# Patient Record
Sex: MALE | Race: WHITE | NOT HISPANIC OR LATINO | Employment: OTHER | URBAN - METROPOLITAN AREA
[De-identification: names, ages, dates, MRNs, and addresses within clinical notes are randomized per-mention and may not be internally consistent; named-entity substitution may affect disease eponyms.]

---

## 2019-06-05 PROBLEM — M54.30 SCIATICA: Status: ACTIVE | Noted: 2019-06-05

## 2021-01-13 ENCOUNTER — IMMUNIZATION (OUTPATIENT)
Dept: FAMILY MEDICINE | Facility: CLINIC | Age: 76
End: 2021-01-13
Payer: MEDICARE

## 2021-01-13 DIAGNOSIS — Z23 NEED FOR VACCINATION: ICD-10-CM

## 2021-01-13 PROCEDURE — 91300 COVID-19, MRNA, LNP-S, PF, 30 MCG/0.3 ML DOSE VACCINE: CPT | Mod: PBBFAC | Performed by: FAMILY MEDICINE

## 2021-02-03 ENCOUNTER — IMMUNIZATION (OUTPATIENT)
Dept: FAMILY MEDICINE | Facility: CLINIC | Age: 76
End: 2021-02-03
Payer: MEDICARE

## 2021-02-03 DIAGNOSIS — Z23 NEED FOR VACCINATION: Primary | ICD-10-CM

## 2021-02-03 PROCEDURE — 0002A COVID-19, MRNA, LNP-S, PF, 30 MCG/0.3 ML DOSE VACCINE: CPT | Mod: PBBFAC | Performed by: FAMILY MEDICINE

## 2021-02-03 PROCEDURE — 91300 COVID-19, MRNA, LNP-S, PF, 30 MCG/0.3 ML DOSE VACCINE: CPT | Mod: PBBFAC | Performed by: FAMILY MEDICINE

## 2021-08-12 PROBLEM — U07.1 COVID-19 VIRUS INFECTION: Status: ACTIVE | Noted: 2021-08-12

## 2022-01-19 ENCOUNTER — LAB VISIT (OUTPATIENT)
Dept: PRIMARY CARE CLINIC | Facility: OTHER | Age: 77
End: 2022-01-19
Payer: MEDICARE

## 2022-01-19 ENCOUNTER — TELEPHONE (OUTPATIENT)
Dept: FAMILY MEDICINE | Facility: CLINIC | Age: 77
End: 2022-01-19
Payer: MEDICARE

## 2022-01-19 DIAGNOSIS — R05.9 COUGH: ICD-10-CM

## 2022-01-19 DIAGNOSIS — R05.9 COUGH: Primary | ICD-10-CM

## 2022-01-19 PROCEDURE — U0003 INFECTIOUS AGENT DETECTION BY NUCLEIC ACID (DNA OR RNA); SEVERE ACUTE RESPIRATORY SYNDROME CORONAVIRUS 2 (SARS-COV-2) (CORONAVIRUS DISEASE [COVID-19]), AMPLIFIED PROBE TECHNIQUE, MAKING USE OF HIGH THROUGHPUT TECHNOLOGIES AS DESCRIBED BY CMS-2020-01-R: HCPCS | Performed by: INTERNAL MEDICINE

## 2022-01-20 LAB
SARS-COV-2 RNA RESP QL NAA+PROBE: NOT DETECTED
SARS-COV-2- CYCLE NUMBER: NORMAL

## 2022-01-26 ENCOUNTER — LAB VISIT (OUTPATIENT)
Dept: PRIMARY CARE CLINIC | Facility: OTHER | Age: 77
End: 2022-01-26
Payer: MEDICARE

## 2022-01-26 ENCOUNTER — TELEPHONE (OUTPATIENT)
Dept: UROLOGY | Facility: CLINIC | Age: 77
End: 2022-01-26
Payer: MEDICARE

## 2022-01-26 DIAGNOSIS — Z20.822 ENCOUNTER FOR LABORATORY TESTING FOR COVID-19 VIRUS: ICD-10-CM

## 2022-01-26 PROCEDURE — U0003 INFECTIOUS AGENT DETECTION BY NUCLEIC ACID (DNA OR RNA); SEVERE ACUTE RESPIRATORY SYNDROME CORONAVIRUS 2 (SARS-COV-2) (CORONAVIRUS DISEASE [COVID-19]), AMPLIFIED PROBE TECHNIQUE, MAKING USE OF HIGH THROUGHPUT TECHNOLOGIES AS DESCRIBED BY CMS-2020-01-R: HCPCS | Performed by: FAMILY MEDICINE

## 2022-01-26 NOTE — TELEPHONE ENCOUNTER
----- Message from Carlos Hart sent at 1/26/2022 10:44 AM CST -----  Contact: pt  Type:  Sooner Apoointment Request    Caller is requesting a sooner appointment.  Caller declined first available appointment listed below.  Caller will not accept being placed on the waitlist and is requesting a message be sent to doctor.    Name of Caller:  pt  When is the first available appointment?  04/13  Symptoms:  swolen testicle  Best Call Back Number:  [phone]    Additional Information:  pt has a swolen testicle, 2 times larger than the other.  First avail appt was 04/13, can you review and get him in sooner, and contact him with the date and time.

## 2022-01-27 LAB
SARS-COV-2 RNA RESP QL NAA+PROBE: NOT DETECTED
SARS-COV-2- CYCLE NUMBER: NORMAL

## 2022-03-31 PROBLEM — I87.2 VENOUS INSUFFICIENCY: Status: ACTIVE | Noted: 2022-03-31

## 2022-03-31 PROBLEM — R60.0 BILATERAL EDEMA OF LOWER EXTREMITY: Status: ACTIVE | Noted: 2022-03-31

## 2022-03-31 PROBLEM — I25.84 CORONARY ARTERY DISEASE DUE TO CALCIFIED CORONARY LESION: Status: ACTIVE | Noted: 2022-03-31

## 2022-03-31 PROBLEM — M54.30 SCIATICA: Status: RESOLVED | Noted: 2019-06-05 | Resolved: 2022-03-31

## 2022-03-31 PROBLEM — N39.3 STRESS INCONTINENCE, MALE: Status: ACTIVE | Noted: 2022-03-31

## 2022-03-31 PROBLEM — I10 ESSENTIAL HYPERTENSION: Status: ACTIVE | Noted: 2022-03-31

## 2022-03-31 PROBLEM — N43.40 SPERMATOCELE OF EPIDIDYMIS: Status: ACTIVE | Noted: 2022-03-31

## 2022-03-31 PROBLEM — I25.10 CORONARY ARTERY DISEASE DUE TO CALCIFIED CORONARY LESION: Status: ACTIVE | Noted: 2022-03-31

## 2022-04-05 PROBLEM — I87.8 VENOUS STASIS OF BOTH LOWER EXTREMITIES: Status: ACTIVE | Noted: 2022-04-05

## 2022-04-05 PROBLEM — L03.119 CELLULITIS OF LOWER EXTREMITY: Status: ACTIVE | Noted: 2022-04-05

## 2023-02-10 PROBLEM — J18.9 PNEUMONIA OF RIGHT UPPER LOBE DUE TO INFECTIOUS ORGANISM: Status: ACTIVE | Noted: 2023-02-10

## 2023-02-10 PROBLEM — R79.89 ELEVATED BRAIN NATRIURETIC PEPTIDE (BNP) LEVEL: Status: ACTIVE | Noted: 2023-02-10

## 2023-02-11 PROBLEM — Z71.89 ADVANCE CARE PLANNING: Status: ACTIVE | Noted: 2023-02-11

## 2023-05-15 PROBLEM — J18.9 PNEUMONIA OF RIGHT UPPER LOBE DUE TO INFECTIOUS ORGANISM: Status: RESOLVED | Noted: 2023-02-10 | Resolved: 2023-05-15

## 2024-05-13 ENCOUNTER — OFFICE VISIT (OUTPATIENT)
Dept: OPTOMETRY | Facility: CLINIC | Age: 79
End: 2024-05-13
Payer: MEDICARE

## 2024-05-13 DIAGNOSIS — Z96.1 PSEUDOPHAKIA OF BOTH EYES: ICD-10-CM

## 2024-05-13 DIAGNOSIS — H52.203 MYOPIA WITH ASTIGMATISM AND PRESBYOPIA, BILATERAL: ICD-10-CM

## 2024-05-13 DIAGNOSIS — H52.13 MYOPIA WITH ASTIGMATISM AND PRESBYOPIA, BILATERAL: ICD-10-CM

## 2024-05-13 DIAGNOSIS — Z01.00 ROUTINE EYE EXAM: ICD-10-CM

## 2024-05-13 DIAGNOSIS — H43.813 POSTERIOR VITREOUS DETACHMENT OF BOTH EYES: Primary | ICD-10-CM

## 2024-05-13 DIAGNOSIS — H52.4 MYOPIA WITH ASTIGMATISM AND PRESBYOPIA, BILATERAL: ICD-10-CM

## 2024-05-13 PROCEDURE — 3288F FALL RISK ASSESSMENT DOCD: CPT | Mod: CPTII,S$GLB,,

## 2024-05-13 PROCEDURE — 1101F PT FALLS ASSESS-DOCD LE1/YR: CPT | Mod: CPTII,S$GLB,,

## 2024-05-13 PROCEDURE — 92004 COMPRE OPH EXAM NEW PT 1/>: CPT | Mod: S$GLB,,,

## 2024-05-13 PROCEDURE — 1159F MED LIST DOCD IN RCRD: CPT | Mod: CPTII,S$GLB,,

## 2024-05-13 PROCEDURE — 99999 PR PBB SHADOW E&M-EST. PATIENT-LVL III: CPT | Mod: PBBFAC,,,

## 2024-05-13 PROCEDURE — 1126F AMNT PAIN NOTED NONE PRSNT: CPT | Mod: CPTII,S$GLB,,

## 2024-05-13 NOTE — PROGRESS NOTES
HPI    New pt here for annual exam.   PCIOL OU 2020    Pt wearing +2.00 for reading and +4.00 for up close work. Pt sts after cat   sx OU he notices a haze on and off. Pt had YAG and doc sts that is the   best they can do. DVA stable. Pt c/o floaters OU, no changes. Pt denies   flashes/pain. Pt using AT's PRN.   Last edited by Annita Calvert on 5/13/2024  9:52 AM.            Assessment /Plan     For exam results, see Encounter Report.    Posterior vitreous detachment of both eyes  -     Ambulatory referral/consult to Ophthalmology; Future; Expected date: 05/20/2024    Pseudophakia of both eyes    Myopia with astigmatism and presbyopia, bilateral    Routine eye exam  -     Ambulatory referral/consult to Optometry      Posterior vitreous detachment OU. Pt reports since cataract surgery and YAG floaters have gotten worse. Pt has difficulties with near tasks secondary to floaters, would like to discuss treatment options. Referral placed to retina for further evaluation and potential treatment. Reviewed signs and symptoms of a retinal detachment thoroughly and ed pt to RTC asap if experienced.  PCIOL OU with open capsules. Stable. Monitor yearly for changes.  Pt happy with uncorrected DVA. Ok to continue with OTC readers as needed for near work. No spec rx given today.  See above.    RTC: to retina to discuss surgical option for PVD/floaters

## 2024-07-18 ENCOUNTER — OFFICE VISIT (OUTPATIENT)
Dept: OPHTHALMOLOGY | Facility: CLINIC | Age: 79
End: 2024-07-18
Payer: MEDICARE

## 2024-07-18 DIAGNOSIS — H43.813 POSTERIOR VITREOUS DETACHMENT, BILATERAL: ICD-10-CM

## 2024-07-18 DIAGNOSIS — H43.813 POSTERIOR VITREOUS DETACHMENT OF BOTH EYES: Primary | ICD-10-CM

## 2024-07-18 DIAGNOSIS — H43.393 OTHER VITREOUS OPACITIES, BILATERAL: ICD-10-CM

## 2024-07-18 PROCEDURE — 92004 COMPRE OPH EXAM NEW PT 1/>: CPT | Mod: S$GLB,,, | Performed by: OPHTHALMOLOGY

## 2024-07-18 PROCEDURE — 92201 OPSCPY EXTND RTA DRAW UNI/BI: CPT | Mod: S$GLB,,, | Performed by: OPHTHALMOLOGY

## 2024-07-18 PROCEDURE — 3288F FALL RISK ASSESSMENT DOCD: CPT | Mod: CPTII,S$GLB,, | Performed by: OPHTHALMOLOGY

## 2024-07-18 PROCEDURE — 1159F MED LIST DOCD IN RCRD: CPT | Mod: CPTII,S$GLB,, | Performed by: OPHTHALMOLOGY

## 2024-07-18 PROCEDURE — 1101F PT FALLS ASSESS-DOCD LE1/YR: CPT | Mod: CPTII,S$GLB,, | Performed by: OPHTHALMOLOGY

## 2024-07-18 PROCEDURE — 1160F RVW MEDS BY RX/DR IN RCRD: CPT | Mod: CPTII,S$GLB,, | Performed by: OPHTHALMOLOGY

## 2024-07-18 PROCEDURE — 99999 PR PBB SHADOW E&M-EST. PATIENT-LVL III: CPT | Mod: PBBFAC,,, | Performed by: OPHTHALMOLOGY

## 2024-07-18 PROCEDURE — 92134 CPTRZ OPH DX IMG PST SGM RTA: CPT | Mod: S$GLB,,, | Performed by: OPHTHALMOLOGY

## 2024-07-18 NOTE — PROGRESS NOTES
HPI     new patient to retina clinic      Additional comments: Dfe   Oct  Pvd   Floater is bigger in  OD lately      blurred episode  and teary  more   often   Blurred va            Comments    New pt here for annual exam.   PCIOL OU 2020  Dls  05/13/24  Pt wearing +2.00 for reading and +4.00 for up close work. Pt sts after cat   sx OU he notices a haze on and off. Pt had YAG and doc sts that is the   best they can do. DVA stable. Pt c/o floaters OU, no changes. Pt denies   flashes/pain. Pt using AT's PRN.           Last edited by Darlene Martinez on 7/18/2024  2:14 PM.          OCT - PVD OU      A/P    PVD with dense vitreous opacities OD>OS  Does interfere with decoy crafting.    Would benefit from PPV    Pt to consider sx    2. PCIOL OU      To OR and PRN depending